# Patient Record
Sex: FEMALE | Race: WHITE | NOT HISPANIC OR LATINO | Employment: OTHER | URBAN - METROPOLITAN AREA
[De-identification: names, ages, dates, MRNs, and addresses within clinical notes are randomized per-mention and may not be internally consistent; named-entity substitution may affect disease eponyms.]

---

## 2017-06-14 ENCOUNTER — THERAPY VISIT (OUTPATIENT)
Dept: PHYSICAL THERAPY | Facility: CLINIC | Age: 62
End: 2017-06-14
Payer: COMMERCIAL

## 2017-06-14 DIAGNOSIS — M54.50 LUMBAGO: Primary | ICD-10-CM

## 2017-06-14 PROCEDURE — 97161 PT EVAL LOW COMPLEX 20 MIN: CPT | Mod: GP | Performed by: PHYSICAL THERAPIST

## 2017-06-14 PROCEDURE — 97110 THERAPEUTIC EXERCISES: CPT | Mod: GP | Performed by: PHYSICAL THERAPIST

## 2017-06-14 PROCEDURE — 97112 NEUROMUSCULAR REEDUCATION: CPT | Mod: GP | Performed by: PHYSICAL THERAPIST

## 2017-06-14 NOTE — PROGRESS NOTES
Subjective:    Patient is a 61 year old female presenting with rehab back hpi.   Alejandra Brown is a 61 year old female with a lumbar condition.  Condition occurred with:  Repetition/overuse.  Condition occurred: at home.  This is a new condition  Pt presents to PT with complaints of left low back pain and pain radiating into the left leg to the foot.  She recently moved to [place] and has been doing a lot of lifting and unpacking as she sets up her household.  She does not recall a specific incident but has noticed a gradual increase in pain recently.  Pt reports having a similar bout of pain 5 years ago.  She notes that since then she has had a mild amount of back pain but it has worsened lately and spread into the leg.  Pt reports increased pain with sitting more than 60 minutes.  Standing and walking also cause pain.  Pt has been using nsaids to manage pain .    Patient reports pain:  Lumbar spine left.  Radiates to:  Gluteals left, thigh left and lower leg left.  Pain is described as aching and sharp and is constant and reported as 8/10.  Associated symptoms:  Loss of motion/stiffness and loss of strength. Pain is the same all the time.  Symptoms are exacerbated by sitting, walking, standing and lying down and relieved by rest, activity/movement and NSAID's.  Since onset symptoms are gradually improving.        General health as reported by patient is good.  Pertinent medical history includes:  Smoking and overweight.  Medical allergies: no.  Other surgeries include:  No.  Current medications:  Anti-depressants.  Current occupation is Retired/caregiver.        Barriers include:  None as reported by the patient.    Red flags:  None as reported by the patient.                        Objective:    System         Lumbar/SI Evaluation  ROM:    AROM Lumbar:   Flexion:          75% (+)  Ext:                    75%    Side Bend:        Left:  75% (+)    Right:  75%  (+)  Rotation:           Left:     Right:   Side  Glide:        Left:     Right:           Lumbar Myotomes:  normal            Lumbar DTR's:  not assessed      Cord Signs:  not assessed    Lumbar Dermtomes:  normal                        Spinal Segmental Conclusions: Pt reported decreased pain and demonstrated improved ROM following repeated movement exercises (standing sideglides and prone extension).                                                       General     ROS    Assessment/Plan:      Patient is a 61 year old female with lumbar complaints.    Patient has the following significant findings with corresponding treatment plan.                Diagnosis 1:  Left low back pain  Pain -  self management, education, directional preference exercise and home program  Decreased ROM/flexibility - manual therapy and therapeutic exercise  Decreased joint mobility - manual therapy and therapeutic exercise  Decreased strength - therapeutic exercise and therapeutic activities  Decreased function - therapeutic activities    Therapy Evaluation Codes:   1) History comprised of:   Personal factors that impact the plan of care:      None.    Comorbidity factors that impact the plan of care are:      None.     Medications impacting care: None.  2) Examination of Body Systems comprised of:   Body structures and functions that impact the plan of care:      Lumbar spine.   Activity limitations that impact the plan of care are:      Bending, Lifting, Sitting, Standing and Walking.  3) Clinical presentation characteristics are:   Stable/Uncomplicated.  4) Decision-Making    Moderate complexity using standardized patient assessment instrument and/or measureable assessment of functional outcome.  Cumulative Therapy Evaluation is: Low complexity.    Previous and current functional limitations:  (See Goal Flow Sheet for this information)    Short term and Long term goals: (See Goal Flow Sheet for this information)     Communication ability:  Patient appears to be able to clearly  communicate and understand verbal and written communication and follow directions correctly.  Treatment Explanation - The following has been discussed with the patient:   RX ordered/plan of care  Anticipated outcomes  Possible risks and side effects  This patient would benefit from PT intervention to resume normal activities.   Rehab potential is good.    Frequency:  1 X week, once daily  Duration:  for 6 weeks  Discharge Plan:  Achieve all LTG.  Independent in home treatment program.  Reach maximal therapeutic benefit.    Please refer to the daily flowsheet for treatment today, total treatment time and time spent performing 1:1 timed codes.

## 2017-06-14 NOTE — MR AVS SNAPSHOT
"              After Visit Summary   6/14/2017    Alejandra Brown    MRN: [id number]           Patient Information     Date Of Birth          1955        Visit Information        Provider Department      6/14/2017 9:30 AM Tobi Kyle, PT [facility]        Today's Diagnoses     Lumbago    -  1       Follow-ups after your visit        Your next 10 appointments already scheduled     Jun 20, 2017 12:00 PM CDT   [facility] with Tobi Kyle PT   [facility]  )    [address]  [place] [address] [phone]              Who to contact     If you have questions or need follow up information about today's clinic visit or your schedule please contact [facility] directly at [phone].  Normal or non-critical lab and imaging results will be communicated to you by MyChart, letter or phone within 4 business days after the clinic has received the results. If you do not hear from us within 7 days, please contact the clinic through Clar[employer] or phone. If you have a critical or abnormal lab result, we will notify you by phone as soon as possible.  Submit refill requests through Telelogos or call your pharmacy and they will forward the refill request to us. Please allow 3 business days for your refill to be completed.          Additional Information About Your Visit        [employer]ar[employer] Information     Tele[employer]os lets you send messages to your doctor, view your test results, renew your prescriptions, schedule appointments and more. To sign up, go to [email or web address] . Click on \"Log in\" on the left side of the screen, which will take you to the Welcome page. Then click on \"Sign up Now\" on the right side of the page.     You will be asked to enter the access code listed below, as well as some personal information. Please " follow the directions to create your username and password.     Your access code is: [email or web address]  Expires: 2017  1:22 PM     Your access code will  in 90 days. If you need help or a new code, please call your [facility] clinic or [phone].        Care EveryWhere ID     This is your Care EveryWhere ID. This could be used by other organizations to access your [facility] medical records  [id number]         Blood Pressure from Last 3 Encounters:   No data found for BP    Weight from Last 3 Encounters:   No data found for Wt              We Performed the Following     [facility] PT EVAL, LOW COMPLEXITY     [facility] INITIAL EVAL REPORT     NEUROMUSCULAR RE-EDUCATION     THERAPEUTIC EXERCISES        Primary Care Provider    None Specified       No primary provider on file.        Thank you!     Thank you for choosing [facility]  for your care. Our goal is always to provide you with excellent care. Hearing back from our patients is one way we can continue to improve our services. Please take a few minutes to complete the written survey that you may receive in the mail after your visit with us. Thank you!             Your Updated Medication List - Protect others around you: Learn how to safely use, store and throw away your medicines at www.disposemymeds.org.      Notice  As of 2017  1:22 PM    You have not been prescribed any medications.

## 2017-06-14 NOTE — LETTER
[facility] PH[facility]ICAL THERAP[facility]  [address]  [place] [address]  [phone]    Danisha 15, 2017    Re: Alejandra Brown   :   1955  MRN:  [id number]   REFERRING PHYSICIAN:   Kahlil Buckner    [facility]    Date of Initial Evaluation:  2017  Visits:  Rxs Used: 1  Reason for Referral:  Lumbago    EVALUATION SUMMARY    Subjective:    Patient is a 61 year old female presenting with rehab back hpi.   Alejandra Brown is a 61 year old female with a lumbar condition.  Condition occurred with:  Repetition/overuse.  Condition occurred: at home.  This is a new condition  Pt presents to PT with complaints of left low back pain and pain radiating into the left leg to the foot.  She recently moved to [place] and has been doing a lot of lifting and unpacking as she sets up her household.  She does not recall a specific incident but has noticed a gradual increase in pain recently.  Pt reports having a similar bout of pain 5 years ago.  She notes that since then she has had a mild amount of back pain but it has worsened lately and spread into the leg.  Pt reports increased pain with sitting more than 60 minutes.  Standing and walking also cause pain.  Pt has been using nsaids to manage pain .    Patient reports pain:  Lumbar spine left.  Radiates to:  Gluteals left, thigh left and lower leg left.  Pain is described as aching and sharp and is constant and reported as 8/10.  Associated symptoms:  Loss of motion/stiffness and loss of strength. Pain is the same all the time.  Symptoms are exacerbated by sitting, walking, standing and lying down and relieved by rest, activity/movement and NSAID's.  Since onset symptoms are gradually improving.        General health as reported by patient is good.  Pertinent medical history includes:  Smoking and overweight.  Medical allergies: no.  Other surgeries include:  No.  Current  medications:  Anti-depressants.  Current occupation is Retired/caregiver.      Barriers include:  None as reported by the patient.  Red flags:  None as reported by the patient.                   Lumbar/SI Evaluation  ROM:    AROM Lumbar:   Flexion:          75% (+)  Ext:                    75%    Side Bend:        Left:  75% (+)    Right:  75%  (+)  Rotation:           Left:     Right:   Side Glide:        Left:     Right:   Lumbar Myotomes:  normal  Lumbar DTR's:  not assessed  Cord Signs:  not assessed  Lumbar Dermtomes:  normal  Spinal Segmental Conclusions: Pt reported decreased pain and demonstrated improved ROM following repeated movement exercises (standing sideglides and prone extension).    Assessment/Plan:    Patient is a 61 year old female with lumbar complaints.    Patient has the following significant findings with corresponding treatment plan.                Diagnosis 1:  Left low back pain  Pain -  self management, education, directional preference exercise and home program  Decreased ROM/flexibility - manual therapy and therapeutic exercise  Decreased joint mobility - manual therapy and therapeutic exercise  Decreased strength - therapeutic exercise and therapeutic activities  Decreased function - therapeutic activities    Therapy Evaluation Codes:   1) History comprised of:   Personal factors that impact the plan of care:      None.    Comorbidity factors that impact the plan of care are:      None.     Medications impacting care: None.  2) Examination of Body Systems comprised of:   Body structures and functions that impact the plan of care:      Lumbar spine.   Activity limitations that impact the plan of care are:      Bending, Lifting, Sitting, Standing and Walking.  3) Clinical presentation characteristics are:   Stable/Uncomplicated.  4) Decision-Making    Moderate complexity using standardized patient assessment instrument and/or measureable assessment of functional outcome.  Cumulative  Therapy Evaluation is: Low complexity.    Previous and current functional limitations:  (See Goal Flow Sheet for this information)    Short term and Long term goals: (See Goal Flow Sheet for this information)         Communication ability:  Patient appears to be able to clearly communicate and understand verbal and written communication and follow directions correctly.  Treatment Explanation - The following has been discussed with the patient:   RX ordered/plan of care  Anticipated outcomes  Possible risks and side effects  This patient would benefit from PT intervention to resume normal activities.   Rehab potential is good.    Frequency:  1 X week, once daily  Duration:  for 6 weeks  Discharge Plan:  Achieve all LTG.  Independent in home treatment program.  Reach maximal therapeutic benefit.            Thank you for your referral.    INQUIRIES  Therapist: Tobi Kyle [facility]   [facility]  [address]  [place] [address]  Phone: [phone]  Fax: [phone]

## 2018-01-03 PROBLEM — M54.50 LUMBAGO: Status: RESOLVED | Noted: 2017-06-14 | Resolved: 2018-01-03

## 2024-01-22 ENCOUNTER — TRANSCRIBE ORDERS (OUTPATIENT)
Dept: OTHER | Age: 69
End: 2024-01-22

## 2024-01-22 DIAGNOSIS — M25.512 LEFT SHOULDER PAIN, UNSPECIFIED CHRONICITY: Primary | ICD-10-CM

## 2024-02-24 ENCOUNTER — HEALTH MAINTENANCE LETTER (OUTPATIENT)
Age: 69
End: 2024-02-24